# Patient Record
Sex: MALE | Race: WHITE | NOT HISPANIC OR LATINO | Employment: FULL TIME | URBAN - METROPOLITAN AREA
[De-identification: names, ages, dates, MRNs, and addresses within clinical notes are randomized per-mention and may not be internally consistent; named-entity substitution may affect disease eponyms.]

---

## 2021-01-12 ENCOUNTER — OFFICE VISIT (OUTPATIENT)
Dept: GASTROENTEROLOGY | Facility: CLINIC | Age: 66
End: 2021-01-12
Payer: COMMERCIAL

## 2021-01-12 VITALS
SYSTOLIC BLOOD PRESSURE: 178 MMHG | HEIGHT: 72 IN | BODY MASS INDEX: 26.82 KG/M2 | WEIGHT: 198 LBS | DIASTOLIC BLOOD PRESSURE: 102 MMHG

## 2021-01-12 DIAGNOSIS — Z12.11 SCREENING FOR COLON CANCER: ICD-10-CM

## 2021-01-12 DIAGNOSIS — K58.2 IRRITABLE BOWEL SYNDROME WITH BOTH CONSTIPATION AND DIARRHEA: ICD-10-CM

## 2021-01-12 DIAGNOSIS — R10.32 LEFT LOWER QUADRANT ABDOMINAL PAIN: ICD-10-CM

## 2021-01-12 DIAGNOSIS — R10.12 LEFT UPPER QUADRANT ABDOMINAL PAIN: Primary | ICD-10-CM

## 2021-01-12 PROCEDURE — 99244 OFF/OP CNSLTJ NEW/EST MOD 40: CPT | Performed by: INTERNAL MEDICINE

## 2021-01-12 RX ORDER — LOVASTATIN 40 MG/1
40 TABLET ORAL DAILY
COMMUNITY
Start: 2021-01-08

## 2021-01-12 NOTE — PROGRESS NOTES
[facility] - Outpatient Consultation  Magdalena Verdugo 48 y o  male MRN: [id number]  Encounter: [id number]    ASSESSMENT AND PLAN:      1  Left upper quadrant abdominal pain  Postprandial left upper quadrant abdominal pain sounds more dyspeptic and upper GI in etiology then the left lower quadrant pain that sounds more spastic and colonic  Differential diagnosis includes peptic ulcer disease, gastritis, less likely GERD in the absence of significant heartburn, unlikely to be celiac although he does have irregular bowel habits thought to be irritable bowel  Symptoms certainly could be all functional as he does have a history of irritable bowel syndrome and recent stressors likely exacerbating  · Diet as tolerated  · Observe for any trigger foods  · Schedule EGD    2  Left lower quadrant abdominal pain  Left lower quadrant abdominal pain sounds more functional   He has had irregularities in his bowels over recent months  This could have been triggered by an infectious etiology  Increased stress with possible move and marital issues are likely compounding factors  Doubt colonic neoplasia in the absence of family history, however his last colonoscopy was about 8 years ago so it is reasonable to reassess given the prolonged symptoms he had this fall  In addition to diet we discussed the benefits of fiber therapy  · As with upper GI symptoms observe for any trigger foods  · Encourage plenty of dietary fiber and fluids  · Would add fiber supplement such as Metamucil/psyllium 2 spoons daily  · Plan colonoscopy    3  Irritable bowel syndrome with both constipation and diarrhea  As discussed above his crampy abdominal pain postprandial dyspepsia and altered bowel habits may all be functional and related to his irritable bowel syndrome  Will plan EGD and colonoscopy to exclude other pathology as outlined  In addition to diet suggest fiber supplementation      4  Screening for colon cancer  Average risk with previous negative colonoscopy  Await results of colonoscopy for recommendations of subsequent screening  Followup Appointment:  About 6 weeks pending result of EGD and colonoscopy  ______________________________________________________________________    Chief Complaint   Patient presents with    Change in Bowel Habits     cramping and discomfort       HPI:   Vinita Silva is a 48y o  year old male who presents left-sided abdominal pain  Gets abdominal discomfort and cramping with eating  Crampy abdominal pain starts in epigastrium and LUQ shortly after eating  Lots of belching and flatus  Has episodes like this intermittently  Assumes much is IBS, often has constipation  Symptoms generally resolve over time, but this time symptoms return  During the st ten days feeling well  Small amount of gas and sharp LUQ pain has resolved  PCP suggested follow up colonoscopy  Bowel habits have changed  Some days no stool, some days would have normal stool, followed by a subsequent runny stool  Theses too have gotten better over the past two weeks  No FHX of CRC, No IBD, no celiac  Cut back on large meals  No fever, nausea or vomiting  Didn't feel well, had trouble waling  No melena or heme  No heartburn or reflux except with some dietary indiscretions  No dysphagia  No new medications or antibiotics  Following blood pressure, due to recent increases  [place]emplating [place] out of [place]   from [place] and planing to relocate  No ASA/NSAID's/supplements  Historical Information   Past Medical History:   Diagnosis Date    Hyperlipidemia     Hypertension      History reviewed  No pertinent surgical history    Social History     Substance and Sexual Activity   Alcohol Use Not Currently     Social History     Substance and Sexual Activity   Drug Use Never     Social History     Tobacco Use   Smoking Status Never Smoker   Smokeless Tobacco Never Used     Family History   Problem Relation Age of Onset    Colon cancer Neg Hx     Colon polyps Neg Hx        Meds/Allergies     Current Outpatient Medications:     lovastatin (MEVACOR) 40 MG tablet    No Known Allergies    PHYSICAL EXAM:    Blood pressure (!) 178/102, height 6' (1 829 m), weight 89 8 kg (198 lb)  Body mass index is 26 85 kg/m²  General Appearance: NAD, cooperative, alert  Eyes: Anicteric, PERRLA, EOMI  ENT:  Normocephalic, atraumatic, normal mucosa  Neck:  Supple, symmetrical, trachea midline,   Resp:  Clear to auscultation bilaterally; no rales, rhonchi or wheezing; respirations unlabored   CV:  S1 S2, Regular rate and rhythm; no murmur, rub, or gallop  GI:  Soft, non-tender, non-distended; normal bowel sounds; no masses, no organomegaly   Rectal: Deferred  Musculoskeletal: No cyanosis, clubbing or edema  Normal ROM  Skin:  No jaundice, rashes, or lesions   Heme/Lymph: No palpable cervical lymphadenopathy  Psych: Normal affect, good eye contact  Neuro: No gross deficits, AAOx3    Lab Results:   No results found for: WBC, HGB, HCT, MCV, PLT  No results found for: NA, K, CL, CO2, ANIONGAP, BUN, CREATININE, GLUCOSE, GLUF, CALCIUM, CORRECTEDCA, AST, ALT, ALKPHOS, PROT, BILITOT, EGFR  No results found for: IRON, TIBC, FERRITIN  No results found for: LIPASE    Radiology Results:   No results found  REVIEW OF SYSTEMS:    CONSTITUTIONAL: Denies any fever, chills, rigors, and weight loss  HEENT: No earache or tinnitus  Denies hearing loss or visual disturbances  CARDIOVASCULAR: No chest pain or palpitations  RESPIRATORY: Denies any cough, hemoptysis, shortness of breath or dyspnea on exertion  GASTROINTESTINAL: As noted in the History of Present Illness  GENITOURINARY: No problems with urination  Denies any hematuria or dysuria  NEUROLOGIC: No dizziness or vertigo, denies headaches  MUSCULOSKELETAL: Denies any muscle or joint pain  SKIN: Denies skin rashes or itching     ENDOCRINE: Denies excessive thirst  Denies intolerance to heat or cold  PSYCHOSOCIAL: Denies depression or anxiety  Denies any recent memory loss

## 2021-01-12 NOTE — PATIENT INSTRUCTIONS
Diet as tolerated, observe for any trigger foods causing symptoms  Encourage plenty of dietary fiber and fluids  Would add fiber supplement such as Metamucil/ generic psyllium husk 2 spoons daily  Schedule EGD and colonoscopy at [facility] [place] ([facility])

## 2021-01-12 NOTE — LETTER
January 12, 2021     Pema Celestin DO  [address] [facility]    [place] [phone]    Patient: Concepcion Ojeda   YOB: 1965   Date of Visit: 1/12/2021       Dear Dr Kellogg Ny: Thank you for referring Concepcion Ojeda to me for evaluation  Below are my notes for this consultation  If you have questions, please do not hesitate to call me  I look forward to following your patient along with you  Sincerely,        Viviane Leach MD        CC: No Recipients  Viviane Leach MD  1/12/2021  5:30 PM  Sign when Signing Visit    [facility] - Outpatient Consultation  Concepcion Ojeda 48 y o  male MRN: [id number]  Encounter: [id number]    ASSESSMENT AND PLAN:      1  Left upper quadrant abdominal pain  Postprandial left upper quadrant abdominal pain sounds more dyspeptic and upper GI in etiology then the left lower quadrant pain that sounds more spastic and colonic  Differential diagnosis includes peptic ulcer disease, gastritis, less likely GERD in the absence of significant heartburn, unlikely to be celiac although he does have irregular bowel habits thought to be irritable bowel  Symptoms certainly could be all functional as he does have a history of irritable bowel syndrome and recent stressors likely exacerbating  · Diet as tolerated  · Observe for any trigger foods  · Schedule EGD    2  Left lower quadrant abdominal pain  Left lower quadrant abdominal pain sounds more functional   He has had irregularities in his bowels over recent months  This could have been triggered by an infectious etiology  Increased stress with possible move and marital issues are likely compounding factors  Doubt colonic neoplasia in the absence of family history, however his last colonoscopy was about 8 years ago so it is reasonable to reassess given the prolonged symptoms he had this fall  In addition to diet we discussed the benefits of fiber therapy    · As with upper GI symptoms observe for any trigger foods  · Encourage plenty of dietary fiber and fluids  · Would add fiber supplement such as Metamucil/psyllium 2 spoons daily  · Plan colonoscopy    3  Irritable bowel syndrome with both constipation and diarrhea  As discussed above his crampy abdominal pain postprandial dyspepsia and altered bowel habits may all be functional and related to his irritable bowel syndrome  Will plan EGD and colonoscopy to exclude other pathology as outlined  In addition to diet suggest fiber supplementation  4  Screening for colon cancer  Average risk with previous negative colonoscopy  Await results of colonoscopy for recommendations of subsequent screening  Followup Appointment:  About 6 weeks pending result of EGD and colonoscopy  ______________________________________________________________________    Chief Complaint   Patient presents with    Change in Bowel Habits     cramping and discomfort       HPI:   Mlaena Fortune is a 48y o  year old male who presents left-sided abdominal pain  Gets abdominal discomfort and cramping with eating  Crampy abdominal pain starts in epigastrium and LUQ shortly after eating  Lots of belching and flatus  Has episodes like this intermittently  Assumes much is IBS, often has constipation  Symptoms generally resolve over time, but this time symptoms return  During the st ten days feeling well  Small amount of gas and sharp LUQ pain has resolved  PCP suggested follow up colonoscopy  Bowel habits have changed  Some days no stool, some days would have normal stool, followed by a subsequent runny stool  Theses too have gotten better over the past two weeks  No FHX of CRC, No IBD, no celiac  Cut back on large meals  No fever, nausea or vomiting  Didn't feel well, had trouble waling  No melena or heme  No heartburn or reflux except with some dietary indiscretions  No dysphagia  No new medications or antibiotics    Following blood pressure, due to recent increases  Contemplating moving out of [place]   from wife and planing to relocate  No ASA/NSAID's/supplements  Historical Information   Past Medical History:   Diagnosis Date    Hyperlipidemia     Hypertension      History reviewed  No pertinent surgical history  Social History     Substance and Sexual Activity   Alcohol Use Not Currently     Social History     Substance and Sexual Activity   Drug Use Never     Social History     Tobacco Use   Smoking Status Never Smoker   Smokeless Tobacco Never Used     Family History   Problem Relation Age of Onset    Colon cancer Neg Hx     Colon polyps Neg Hx        Meds/Allergies     Current Outpatient Medications:     lovastatin (MEVACOR) 40 MG tablet    No Known Allergies    PHYSICAL EXAM:    Blood pressure (!) 178/102, height 6' (1 829 m), weight 89 8 kg (198 lb)  Body mass index is 26 85 kg/m²  General Appearance: NAD, cooperative, alert  Eyes: Anicteric, PERRLA, EOMI  ENT:  Normocephalic, atraumatic, normal mucosa  Neck:  Supple, symmetrical, trachea midline,   Resp:  Clear to auscultation bilaterally; no rales, rhonchi or wheezing; respirations unlabored   CV:  S1 S2, Regular rate and rhythm; no murmur, rub, or gallop  GI:  Soft, non-tender, non-distended; normal bowel sounds; no masses, no organomegaly   Rectal: Deferred  Musculoskeletal: No cyanosis, clubbing or edema  Normal ROM  Skin:  No jaundice, rashes, or lesions   Heme/Lymph: No palpable cervical lymphadenopathy  Psych: Normal affect, good eye contact  Neuro: No gross deficits, AAOx3    Lab Results:   No results found for: WBC, HGB, HCT, MCV, PLT  No results found for: NA, K, CL, CO2, ANIONGAP, BUN, CREATININE, GLUCOSE, GLUF, CALCIUM, CORRECTEDCA, AST, ALT, ALKPHOS, PROT, BILITOT, EGFR  No results found for: IRON, TIBC, FERRITIN  No results found for: LIPASE    Radiology Results:   No results found        REVIEW OF SYSTEMS:    CONSTITUTIONAL: Denies any fever, chills, rigors, and weight loss  HEENT: No earache or tinnitus  Denies hearing loss or visual disturbances  CARDIOVASCULAR: No chest pain or palpitations  RESPIRATORY: Denies any cough, hemoptysis, shortness of breath or dyspnea on exertion  GASTROINTESTINAL: As noted in the History of Present Illness  GENITOURINARY: No problems with urination  Denies any hematuria or dysuria  NEUROLOGIC: No dizziness or vertigo, denies headaches  MUSCULOSKELETAL: Denies any muscle or joint pain  SKIN: Denies skin rashes or itching  ENDOCRINE: Denies excessive thirst  Denies intolerance to heat or cold  PSYCHOSOCIAL: Denies depression or anxiety  Denies any recent memory loss

## 2021-02-26 VITALS — HEIGHT: 72 IN | WEIGHT: 198 LBS | BODY MASS INDEX: 26.82 KG/M2

## 2021-02-26 DIAGNOSIS — Z12.11 SCREENING FOR COLON CANCER: Primary | ICD-10-CM

## 2021-02-26 RX ORDER — SODIUM PICOSULFATE, MAGNESIUM OXIDE, AND ANHYDROUS CITRIC ACID 10; 3.5; 12 MG/160ML; G/160ML; G/160ML
LIQUID ORAL
Qty: 2 BOTTLE | Refills: 0 | Status: SHIPPED | OUTPATIENT
Start: 2021-02-26 | End: 2021-03-05 | Stop reason: HOSPADM

## 2021-02-26 RX ORDER — LISINOPRIL 10 MG/1
10 TABLET ORAL DAILY
COMMUNITY

## 2021-02-26 NOTE — TELEPHONE ENCOUNTER
Why does your doctor want you to have this procedure? Do you have kidney disease?  no  If yes, are you on dialysis :     Have you had diverticulitis within the past 2 months? no    Are you diabetic?  no  If yes, insulin dependent: If yes, provide diabetic instructions sheet     Do take iron supplements?  no  If yes, instruct patient to hold iron supplement for 7 days prior    Are you on a blood thinner? no   Was the blood thinner sheet complete and faxed to cardiologist no  Plavix (clopidogrel), Coumadin (warfarin), Lovenox (enoxaparin), Xarelto (rivaroxaban), Pradaxa(dabigatran), Eliquis(apixaban) Savaysa/Lixiana (edoxapan)    Do you have an automatic implantable cardiac defibrillator (AICD)/pacemaker ([facility])? no  Was AICD/pacemaker sheet completed and faxed to cardiologist? no    Are you on home oxygen? no  If yes, continuous or nocturnal:     Have you been treated for MRSA, VRE or any communicable diseases? no    Heart attack, stroke, or stent within 3 months? no  Schedule at Hospital if within 3-6 months   Use nitroglycerin for chest pain in the last 6 months? no    History of organ  transplant?  no   If yes, notify Endo      History of neck/throat/tongue surgery or cancer? no  IF yes, notify Endo      Any problems with anesthesia in the past? no     Was stool C diff ordered?  no Stool specimen needs to be completed prior to procedure    Do have any facial or body piercings?no     Do you have a latex allergy? no     Do have an allergy to metals? (Bravo study only) no     If pediatric patient, was consent faxed to pediatrician no     Patient rights reviewed yes     Combo phone prep completed; clenpiq instructions reviewed and emailed to pt; rx forwarded to provider

## 2021-03-01 NOTE — TELEPHONE ENCOUNTER
Pt left  mssg that prep is $60 for proc Fri; req [phone]  Called Pt back; advised we can provide a sample  Pt to  Clenpiq sample this afternoon

## 2021-03-05 ENCOUNTER — ANESTHESIA (OUTPATIENT)
Dept: GASTROENTEROLOGY | Facility: AMBULATORY SURGERY CENTER | Age: 66
End: 2021-03-05

## 2021-03-05 ENCOUNTER — ANESTHESIA EVENT (OUTPATIENT)
Dept: GASTROENTEROLOGY | Facility: AMBULATORY SURGERY CENTER | Age: 66
End: 2021-03-05

## 2021-03-05 ENCOUNTER — HOSPITAL ENCOUNTER (OUTPATIENT)
Dept: GASTROENTEROLOGY | Facility: AMBULATORY SURGERY CENTER | Age: 66
Discharge: HOME | End: 2021-03-05
Payer: COMMERCIAL

## 2021-03-05 VITALS
SYSTOLIC BLOOD PRESSURE: 141 MMHG | DIASTOLIC BLOOD PRESSURE: 89 MMHG | OXYGEN SATURATION: 99 % | RESPIRATION RATE: 16 BRPM | TEMPERATURE: 98.2 F | HEART RATE: 71 BPM

## 2021-03-05 DIAGNOSIS — K58.2 IRRITABLE BOWEL SYNDROME WITH BOTH CONSTIPATION AND DIARRHEA: ICD-10-CM

## 2021-03-05 DIAGNOSIS — R10.32 LEFT LOWER QUADRANT ABDOMINAL PAIN: ICD-10-CM

## 2021-03-05 DIAGNOSIS — R10.12 LEFT UPPER QUADRANT ABDOMINAL PAIN: ICD-10-CM

## 2021-03-05 PROCEDURE — 43239 EGD BIOPSY SINGLE/MULTIPLE: CPT | Performed by: INTERNAL MEDICINE

## 2021-03-05 PROCEDURE — 45380 COLONOSCOPY AND BIOPSY: CPT | Performed by: INTERNAL MEDICINE

## 2021-03-05 RX ORDER — SODIUM CHLORIDE 9 MG/ML
50 INJECTION, SOLUTION INTRAVENOUS CONTINUOUS
Status: DISCONTINUED | OUTPATIENT
Start: 2021-03-05 | End: 2021-03-05

## 2021-03-05 RX ORDER — PROPOFOL 10 MG/ML
INJECTION, EMULSION INTRAVENOUS AS NEEDED
Status: DISCONTINUED | OUTPATIENT
Start: 2021-03-05 | End: 2021-03-05

## 2021-03-05 RX ADMIN — PROPOFOL 150 MG: 10 INJECTION, EMULSION INTRAVENOUS at 08:18

## 2021-03-05 RX ADMIN — PROPOFOL 100 MG: 10 INJECTION, EMULSION INTRAVENOUS at 08:34

## 2021-03-05 RX ADMIN — PROPOFOL 50 MG: 10 INJECTION, EMULSION INTRAVENOUS at 08:40

## 2021-03-05 RX ADMIN — PROPOFOL 50 MG: 10 INJECTION, EMULSION INTRAVENOUS at 08:21

## 2021-03-05 RX ADMIN — SODIUM CHLORIDE 50 ML/HR: 9 INJECTION, SOLUTION INTRAVENOUS at 07:46

## 2021-03-05 RX ADMIN — PROPOFOL 50 MG: 10 INJECTION, EMULSION INTRAVENOUS at 08:25

## 2021-03-05 NOTE — ANESTHESIA PREPROCEDURE EVALUATION
Procedure:  COLONOSCOPY  EGD    Relevant Problems   CARDIO   (+) Hyperlipidemia   (+) Hypertension        Physical Exam    Airway    Mallampati score: II  TM Distance: >3 FB  Neck ROM: full     Dental   Comment: caps,     Cardiovascular  Rhythm: regular, Rate: normal, Cardiovascular exam normal    Pulmonary  Pulmonary exam normal Breath sounds clear to auscultation,     Other Findings        Anesthesia Plan  ASA Score- 2     Anesthesia Type- IV sedation with anesthesia with ASA Monitors  Additional Monitors:   Airway Plan:           Plan Factors-        Patient is not a current smoker  Induction- intravenous  Postoperative Plan-     Informed Consent- Anesthetic plan and risks discussed with patient

## 2021-03-05 NOTE — DISCHARGE INSTRUCTIONS
Celiac Disease   WHAT YOU NEED TO KNOW:   What is celiac disease? Celiac disease is a long-term condition that affects your small intestine  Your immune system reacts to the protein gluten in food and damages your small intestine  You may not be able to absorb vitamins, minerals, and other nutrients from the foods you eat  What increases my risk of celiac disease? The cause of celiac disease is not known  You are at higher risk if you have another autoimmune disease, such as rheumatoid arthritis, or a family member with celiac disease  What are the signs and symptoms of celiac disease? The most common symptom of celiac disease is diarrhea that may smell bad or look oily  You may also have the following:  · Stomach pain, bloating, gas, and weight loss    · Weakness, low energy, and loss of appetite    · Bone pain or osteoporosis (bone loss)    · Missed monthly periods or difficulty getting pregnant    · Numbness or tingling in your legs and muscle cramps    · Mouth sores or a skin rash that itches    How is celiac disease diagnosed? · Blood tests  are used to check for antibodies to gluten  They may also be used to check for anemia and other deficiencies caused by celiac disease  · A sample of your bowel movement  is tested to see if you are absorbing nutrients from your diet  For this test, you will need to eat a high-fat diet for 1 day  Then you will need to collect your bowel movements for 2 days  The samples will be sent to a lab  · Small bowel barium x-rays  are pictures of your abdomen  You will need to swallow a thick liquid called barium  Barium helps the intestines show up better on the x-ray  · An endoscopic tissue biopsy  is a procedure to look at the inside of your small intestine  An endoscope is a bendable tube with a light and camera on the end  Healthcare providers may remove a small amount of tissue for a biopsy  How is celiac disease treated?   Since there is no cure for celiac disease, the goal of treatment is to reduce the symptoms  It may take up to 6 months or longer for your intestines to function better  You may need medicine such as steroids to suppress your immune system and decrease inflammation  How do I manage celiac disease? · Do not eat food that contains gluten  This is the most important way to manage your symptoms  Do not eat anything made with wheat, rye, barley, or oats  Gluten is found in additives in many packaged and restaurant foods  Read food labels or ask before you order food  A dietitian can help you plan meals that do not contain gluten  · Ask about supplements  You may need to take supplements that contain iron, folic acid, vitamin N78, calcium, or vitamin D  These may be given as a pill or through an IV  When should I contact my healthcare provider? · You have new symptoms or your symptoms get worse  · You have questions or concerns about your condition or care  When should I seek immediate care or call 911? · You have a fever  · You have severe abdominal pain  · You have blood in your bowel movement  CARE AGREEMENT:   You have the right to help plan your care  Learn about your health condition and how it may be treated  Discuss treatment options with your healthcare providers to decide what care you want to receive  You always have the right to refuse treatment  The above information is an  only  It is not intended as medical advice for individual conditions or treatments  Talk to your doctor, nurse or pharmacist before following any medical regimen to see if it is safe and effective for you  © Copyright [address] Information is for End User's use only and may not be sold, redistributed or otherwise used for commercial purposes   All illustrations and images included in CareNotes® are the copyrighted property of [employer]  or [facility]  Helicobacter Pylori   WHAT YOU NEED TO KNOW:   What is a Helicobacter pylori (H  pylori) infection? H  pylori bacteria infect the lining of the stomach and upper intestine  People are usually infected with the bacteria as children but do not have symptoms until they are adults  What are the signs and symptoms of an H  pylori infection? Most people who are infected with H  pylori never have symptoms  If you do, your symptoms may come and go and last for minutes or hours  You may feel better for a short time after you eat food or take medicine  You may have any of the following:  · Dull or burning pain in your stomach or throat    · Nausea, vomiting, bloating, or burping    · Loss of appetite or weight loss    · Pain at night or with an empty stomach    How is an H  pylori infection diagnosed? · A urea breath test  means you will swallow pudding, liquid, or a capsule that contains a chemical  Then you will breathe into a container  Your breath sample will be tested for a reaction to the chemical that confirms H  pylori infection  · Bowel movement or blood samples  may be tested for infection  · Endoscopy  is a procedure that may be done if your healthcare provider thinks you have an ulcer  He or she will use a scope to see the inside of your stomach  A scope is a soft, flexible tube with a light and tiny camera on the end  It is passed down your throat and into your stomach  Samples of your stomach tissue may be removed and tested for H  pylori infection  How is an H  pylori infection treated? It is important to treat the infection  H  pylori may lead to changes in the cells of your esophagus or stomach  The cells are changed into intestine cells  This is a condition called intestinal metaplasia that increases your risk for cancer of the esophagus or stomach  The following may be used to treat an infection:  · Antibiotics  help kill the bacteria  You may need to take this medicine for 10 to 14 days   Your healthcare provider will prescribe at least 2 antibiotics at the same time  · Antiulcer medicines  help decrease the amount of acid that is normally made by the stomach  These help relieve pain and heal or prevent ulcers  · Bismuth  is a liquid or tablet that may be used to decrease heartburn, upset stomach, or diarrhea  It may also decrease swelling in your stomach and help kill the infection if other medicines do not work  It also protects ulcers from stomach acid so they can heal     How can I prevent or manage an H  pylori infection? · Wash your hands often  Infection can happen through contact with infected bowel movement, vomit, or saliva  Use soap and warm water  Use an alcohol-based gel if soap and water are not available  Clean your hands before you eat and after you use the bathroom  Clean your hands after you change a baby's diaper  · Handle food properly  Infection can happen if you drink water that is not clean or eat food that is not washed or cooked properly  Rinse food well before you cook or eat it  [place] food all the way through  Proper handling will help kill any bacteria that may be on your food  · Drink clean water from a safe source  Only drink water that has been filtered or purified  · Ask about NSAIDs  NSAIDs can cause stomach bleeding and kidney problems if not taken correctly  Your healthcare provider may tell you to avoid these medicines because they can make your symptoms worse  · Do not smoke  Nicotine and other chemicals in cigarettes and cigars can worsen your symptoms  Ask your provider for information if you currently smoke and need help to quit  E-cigarettes or smokeless tobacco still contain nicotine  Talk to your provider before you use these products  · Do not drink alcohol  Alcohol may worsen your symptoms of heartburn  Alcohol also increases the risk for cancer of the esophagus or stomach  Ask your provider for information if you currently drink alcohol and need help to quit      When should I seek immediate care? · You have bloody bowel movements, bloody vomit, or vomit that looks like coffee grounds  · You have sudden, sharp stomach pain that does not go away or spreads to your back  When should I call my doctor? · Your symptoms do not improve with treatment  · You feel full after eating only a small amount of food  · You lose weight without trying  · You have questions or concerns about your condition or care  CARE AGREEMENT:   You have the right to help plan your care  Learn about your health condition and how it may be treated  Discuss treatment options with your healthcare providers to decide what care you want to receive  You always have the right to refuse treatment  The above information is an  only  It is not intended as medical advice for individual conditions or treatments  Talk to your doctor, nurse or pharmacist before following any medical regimen to see if it is safe and effective for you  © Copyright [employer] Information is for End User's use only and may not be sold, redistributed or otherwise used for commercial purposes  All illustrations and images included in CareNotes® are the copyrighted property of [employer]  or [address] [place] [address]   WHAT YOU NEED TO KNOW:   What is Garcia esophagus? Garcia esophagus is a condition that is also called intestinal metaplasia  Cells that line your esophagus change into cells that are like intestine cells  The change increases your risk for esophageal cancer  What increases my risk for Garcia esophagus? The exact cause of Garcia esophagus is not known  The following may increase your risk:  · Long-term gastroesophageal reflux disease (GERD)    · Age older than 48    · A family history of GERD, Garcia esophagus, or esophageal cancer    · Obesity    · Smoking cigarettes    What are the signs and symptoms of Garcia esophagus?   Signs and symptoms are usually related to the signs and symptoms of GERD  You may have any of the following:  · Heartburn (burning pain in your chest)    · Pain after meals that spreads to your neck, jaw, or shoulder    · Pain that gets better when you change positions    · Bitter or acid taste in your mouth    · A dry cough    · Trouble swallowing or pain with swallowing    · Hoarseness or a sore throat    · Burping or hiccups    · Feeling full soon after you start eating    How is Garcia esophagus diagnosed? An endoscopy is a procedure used to see the inside of your esophagus and stomach  A scope is a long, bendable tube with a light on the end of it  A camera may be hooked to the scope  Your healthcare provider may also take tissue samples to be tested for dysplasia (abnormal changes in your cells and tissues)  If dysplasia is found, it will be given a grade to describe the amount of change  The grade can range from negative (no dysplasia) to high-grade (a large amount)  You have a higher risk for cancer with high-grade dysplasia  How is Garcia esophagus treated? Treatment depends on the grade of dysplasia  The goal of treatment is to control your symptoms and prevent esophageal cancer  Your healthcare provider may also suggest that you make changes in the foods you eat and in your lifestyle  You may need any of the following:  · Anti-reflux medicines  help decrease the stomach acid that can irritate your esophagus and stomach  These medicines may include proton pump inhibitors (PPI) and histamine type-2 receptor (H2) blockers  You may also be given medicines to stop vomiting  · Surgery or other procedures  may be done if you have high-grade dysplasia  Abnormal cells may be killed with heat or by freezing them  Light may be used to kill abnormal cells  It is used in combination with medicines that make the abnormal cells sensitive to light   Surgery may be used to wrap the upper part of your stomach around the esophageal sphincter to strengthen it  Surgery may be needed to remove part or all of your esophagus  What can I do to manage Garcia esophagus? · Do not eat foods that make your symptoms worse  Examples are chocolate, garlic, onions, spicy or fatty foods, citrus fruits (oranges), and tomato-based foods (spaghetti sauce)  Do not drink alcohol, drinks that contain caffeine, or carbonated drinks, such as soda  Ask your healthcare provider if there are other foods and drinks you should not have  · Maintain a healthy weight  If you are overweight, weight loss may help relieve symptoms  Ask your healthcare provider about safe ways to lose weight  · Do not smoke  If you smoke, it is never too late to quit  Smoking may worsen acid reflux  Ask your healthcare provider for information if you need help quitting  Where can I get support and more information? · [address]  [facility]  Phone: [phone]  Web Address: http://[email or web address]  org    Call your local emergency number (911 in the [address] [place] [address],[address]) if:   · You have severe chest pain and shortness of breath  When should I seek immediate care? · Your bowel movements are black, bloody, or tarry  · Your vomit looks like coffee grounds or has blood in it  When should I call my doctor? · Your symptoms do not improve with treatment  · You have questions or concerns about your condition or care  CARE AGREEMENT:   You have the right to help plan your care  Learn about your health condition and how it may be treated  Discuss treatment options with your healthcare providers to decide what care you want to receive  You always have the right to refuse treatment  The above information is an  only  It is not intended as medical advice for individual conditions or treatments  Talk to your doctor, nurse or pharmacist before following any medical regimen to see if it is safe and effective for you    © Copyright [employer] 2020 Information is for End User's use only and may not be sold, redistributed or otherwise used for commercial purposes  All illustrations and images included in CareNotes® are the copyrighted property of [employer] M [employer] Inc  or Charisse Angelo  Diverticulosis   WHAT YOU NEED TO KNOW:   What is diverticulosis? Diverticulosis is a condition that causes small pockets called diverticula to form in your intestine  These pockets make it difficult for bowel movements to pass through your digestive system  What causes diverticulosis? Diverticula form when muscles have to work hard to move bowel movements through the intestine  The force causes bulges to form at weak areas in the intestine  This may happen if you eat foods that are low in fiber  Fiber helps give your bowel movements more bulk so they are larger and easier to move through your colon  The following may increase your risk of diverticulosis:  · A history of constipation    · Age 36 or older    · Obesity    · Lack of exercise    What are the signs and symptoms of diverticulosis? Diverticulosis usually does not cause any signs or symptoms  It may cause any of the following in some people:  · Pain or discomfort in your lower abdomen    · Abdominal bloating    · Constipation or diarrhea    How is diverticulosis diagnosed? Your healthcare provider will examine you and ask about your bowel movements, diet, and symptoms  He or she will also ask about any medical conditions you have or medicines you take  You may need any of the following:  · Blood tests  may be done to check for signs of inflammation  · A barium enema  is an x-ray of your colon that may show diverticula  A tube is put into your anus, and a liquid called barium is put through the tube  Barium is used so that healthcare providers can see your colon more clearly  · Flexible sigmoidoscopy  is a test to look for any changes in your lower intestines and rectum   It may also show the cause of any bleeding or pain  A soft, bendable tube with a light on the end will be put into your anus  It will then be moved forward into your intestine  · A colonoscopy  is used to look at your whole colon  A scope (long bendable tube with a light on the end) is used to take pictures  This test may show diverticula  · A CT scan , or CAT scan, may show diverticula  You may be given contrast liquid before the scan  Tell the healthcare provider if you have ever had an allergic reaction to contrast liquid  How is diverticulosis managed? The goal of treatment is to manage any symptoms you have and prevent other problems such as diverticulitis  Diverticulitis is swelling or infection of the diverticula  Your healthcare provider may recommend any of the following:  · Eat a variety of high-fiber foods  High-fiber foods help you have regular bowel movements  High-fiber foods include cooked beans, fruits, vegetables, and some cereals  Most adults need 25 to 35 grams of fiber each day  Your healthcare provider may recommend that you have more  Ask your healthcare provider how much fiber you need  Increase fiber slowly  You may have abdominal discomfort, bloating, and gas if you add fiber to your diet too quickly  You may need to take a fiber supplement if you are not getting enough fiber from food  · Medicines  to soften your bowel movements may be given  You may also need medicines to treat symptoms such as bloating and pain  · Drink liquids as directed  You may need to drink 2 to 3 liters (8 to 12 cups) of liquids every day  Ask your healthcare provider how much liquid to drink each day and which liquids are best for you  · Apply heat  on your abdomen for 20 to 30 minutes every 2 hours for as many days as directed  Heat helps decrease pain and muscle spasms  How can I help prevent diverticulitis or other symptoms?   The following may help decrease your risk for diverticulitis or symptoms, such as bleeding  Talk to your provider about these or other things you can do to prevent problems that may occur with diverticulosis  · Exercise regularly  Ask your healthcare provider about the best exercise plan for you  Exercise can help you have regular bowel movements  Get 30 minutes of exercise on most days of the week  · Maintain a healthy weight  Ask your healthcare provider how much you should weigh  Ask him or her to help you create a weight loss plan if you are overweight  · Do not smoke  Nicotine and other chemicals in cigarettes increase your risk for diverticulitis  Ask your healthcare provider for information if you currently smoke and need help to quit  E-cigarettes or smokeless tobacco still contain nicotine  Talk to your healthcare provider before you use these products  · Ask your healthcare provider if it is safe to take NSAIDs  NSAIDs may increase your risk of diverticulitis  When should I seek immediate care? · You have severe pain on the left side of your lower abdomen  · Your bowel movements are bright or dark red  When should I contact my healthcare provider? · You have a fever and chills  · You feel dizzy or lightheaded  · You have nausea, or you are vomiting  · You have a change in your bowel movements  · You have questions or concerns about your condition or care  CARE AGREEMENT:   You have the right to help plan your care  Learn about your health condition and how it may be treated  Discuss treatment options with your healthcare providers to decide what care you want to receive  You always have the right to refuse treatment  The above information is an  only  It is not intended as medical advice for individual conditions or treatments  Talk to your doctor, nurse or pharmacist before following any medical regimen to see if it is safe and effective for you    © Copyright [employer] 2020 Information is for End User's use only and may not be sold, redistributed or otherwise used for commercial purposes  All illustrations and images included in CareNotes® are the copyrighted property of Uri LEDESMA  or [facility] Fiber Diet   WHAT YOU NEED TO KNOW:   What is a high-fiber diet? A high-fiber diet includes foods that have a high amount of fiber  Fiber is the part of fruits, vegetables, and grains that is not broken down by your body  Fiber keeps your bowel movements regular  Fiber can also help lower your cholesterol level, control blood sugar in people with diabetes, and relieve constipation  Fiber can also help you control your weight because it helps you feel full faster  Most adults should eat 25 to 35 grams of fiber each day  Talk to your dietitian or healthcare provider about the amount of fiber you need  What foods are good sources of fiber? · Foods with at least 4 grams of fiber per serving:      ? ? to ½ cup of high-fiber cereal (check the nutrition label on the box)    ? ½ cup of blackberries or raspberries    ? 4 dried prunes    ? 1 cooked artichoke    ? ½ cup of cooked legumes, such as lentils, or red, kidney, and barker beans    · Foods with 1 to 3 grams of fiber per serving:      ? 1 slice of whole-wheat, pumpernickel, or rye bread    ? ½ cup of cooked brown rice    ? 4 whole-wheat crackers    ? 1 cup of oatmeal    ? ½ cup of cereal with 1 to 3 grams of fiber per serving (check the nutrition label on the box)    ? 1 small piece of fruit, such as an apple, banana, pear, kiwi, or orange    ? 3 dates    ? ½ cup of canned apricots, fruit cocktail, peaches, or pears    ? ½ cup of raw or cooked vegetables, such as carrots, cauliflower, cabbage, spinach, squash, or corn  What are some ways that I can increase fiber in my diet? · Choose brown or wild rice instead of white rice  · Use whole wheat flour in recipes instead of white or all-purpose flour  · Add beans and peas to casseroles or soups       · Choose fresh fruit and vegetables with peels or skins on instead of juices  What other guidelines should I follow? · Add fiber to your diet slowly  You may have abdominal discomfort, bloating, and gas if you add fiber to your diet too quickly  · Drink plenty of liquids as you add fiber to your diet  You may have nausea or develop constipation if you do not drink enough water  Ask how much liquid to drink each day and which liquids are best for you  CARE AGREEMENT:   You have the right to help plan your care  Discuss treatment options with your healthcare provider to decide what care you want to receive  You always have the right to refuse treatment  The above information is an  only  It is not intended as medical advice for individual conditions or treatments  Talk to your doctor, nurse or pharmacist before following any medical regimen to see if it is safe and effective for you  © Copyright [address] Information is for End User's use only and may not be sold, redistributed or otherwise used for commercial purposes  All illustrations and images included in CareNotes® are the copyrighted property of [employer] M , Inc  or [facility]  Microscopic Colitis   WHAT YOU NEED TO KNOW:   What is microscopic colitis? Microscopic colitis is long-term inflammation of your colon (large intestine)  Inflammation can damage the lining of your colon and cause long-term diarrhea  Microscopic colitis may be caused by an infection, higher levels of acid in your colon, or the cause may not be known  What increases my risk for microscopic colitis? · Older age    · Being female    · Increased bile acids    · Family history of microscopic colitis or another colon disease    · Bacterial infection    · Medicines for pain, depression, ulcers, or seizures    · Weakened immune system    What are the signs and symptoms of microscopic colitis?    · Diarrhea    · Abdominal pain    · Fatigue     · Nausea or vomiting    · Weight loss    · Loss of bowel movement control    · Dehydration (thirst, dry mouth, or decreased urine)    How is microscopic colitis diagnosed? · Blood tests  may show if you have an infection  · A bowel movement sample  may show what germ is causing your illness  · A colonoscopy  may show what is causing your colitis  A tube with a light and camera on the end will be put into your anus, and moved forward into your intestine  How is microscopic colitis treated? Medicines may be given to treat a bacterial infection, decrease inflammation in your colon, or treat diarrhea  You may also need medicine to decrease acid levels in your colon that could cause irritation  How can I manage my symptoms? · Eat a variety of healthy foods  Healthy foods include fruits, vegetables, whole-grain breads, low-fat dairy products, beans, lean meats, and fish  You may need to eat several small meals throughout the day  Avoid spicy foods, caffeine, chocolate, and foods high in fat  · Drink liquids as directed  to help prevent dehydration  Good liquids to drink include water, juice, and broth  Ask how much liquid to drink each day  You may need to drink an oral rehydration solution (ORS)  An ORS contains a balance of water, salt, and sugar to replace body fluids lost during diarrhea  · Start to exercise when you feel better  Regular exercise helps your bowels work normally  Ask about the best exercise plan for you  · Ask about probiotics  You may need supplements that help balance the bacteria in your colon  This may help decrease you symptoms  How can I help prevent microscopic colitis? · Wash your hands  Wash your hands in warm, soapy water for 20 seconds before and after you handle food  Wash your hands after you use the bathroom, change a diaper, or touch an animal      · Clean food and areas that come in contact with food  Rinse fruits and vegetables in running water   Clean cutting boards, knives, countertops, and other areas where you prepare food before and after you cook  Wash sponges and dishtowels weekly in hot water  · [employer] food all the way through  Cook eggs until the yolks are firm  Use a meat thermometer to make sure meat is heated to a temperature that will kill bacteria  Do not eat raw or undercooked chicken, turkey, seafood, or meat  · Store food properly  Refrigerate or freeze fruits and vegetables, cooked foods, and leftovers  · Drink safe water  Drink only treated water  Do not drink water from ponds or lakes, or from swimming pools that do not contain chlorine  Drink bottled water when traveling  Call 911 for any of the following:   · You have trouble breathing  When should I seek immediate care? · You have any of the following signs of severe dehydration:     ? Dizziness or weakness    ? Dry mouth, cracked lips, or severe thirst    ? Fast heartbeat or breathing    ? Passing little to no urine    · You have black or bright red stools  · You have blood in your vomit  · Your abdomen feels larger than normal, hard, and painful  When should I contact my healthcare provider? · You have a fever with abdominal pain that does not go away  · You have a fever, chills, cough, or feel weak and achy  · Your diarrhea gets worse  · Your symptoms do not improve or get worse  · You are losing weight without trying  · You have questions or concerns about your condition or care  CARE AGREEMENT:   You have the right to help plan your care  Learn about your health condition and how it may be treated  Discuss treatment options with your healthcare providers to decide what care you want to receive  You always have the right to refuse treatment  The above information is an  only  It is not intended as medical advice for individual conditions or treatments   Talk to your doctor, nurse or pharmacist before following any medical regimen to see if it is safe and effective for you  © Copyright [address] Information is for End User's use only and may not be sold, redistributed or otherwise used for commercial purposes  All illustrations and images included in CareNotes® are the copyrighted property of A D A M , Inc  or Charisse Petty   Hemorrhoids   WHAT YOU NEED TO KNOW:   What are hemorrhoids? Hemorrhoids are swollen blood vessels inside your rectum (internal hemorrhoids) or on your anus (external hemorrhoids)  Sometimes a hemorrhoid may prolapse  This means it extends out of your anus  What increases my risk for hemorrhoids? · Pregnancy or obesity    · Straining or sitting for a long time during bowel movements    · Liver disease    · Weak muscles around the anus caused by older age, rectal surgery, or anal intercourse    · A lack of physical activity    · Chronic diarrhea or constipation    · A low-fiber diet    What are the signs and symptoms of hemorrhoids? · Pain or itching around your anus or inside your rectum    · Swelling or bumps around your anus    · Bright red blood in your bowel movement, on the toilet paper, or in the toilet bowl    · Tissue bulging out of your anus (prolapsed hemorrhoids)    · Incontinence (poor control over urine or bowel movements)    How are hemorrhoids diagnosed? Your healthcare provider will ask about your symptoms, the foods you eat, and your bowel movements  He or she will examine your anus for external hemorrhoids  You may need the following:  · A digital rectal exam  is a test to check for hemorrhoids  Your healthcare provider will put a gloved finger inside your anus to feel for the hemorrhoids  · An anoscopy  is a test that uses a scope (small tube with a light and camera on the end) to look at your hemorrhoids  How are hemorrhoids treated? Treatment will depend on your symptoms   You may need any of the following:  · Medicines  can help decrease pain and swelling, and soften your bowel movement  The medicine may be a pill, pad, cream, or ointment  · Procedures  may be used to shrink or remove your hemorrhoid  Examples include rubber-band ligation, sclerotherapy, and photocoagulation  These procedures may be done in your healthcare provider's office  Ask your healthcare provider for more information about these procedures  · Surgery  may be needed to shrink or remove your hemorrhoids  How can I manage my symptoms? · Apply ice on your anus for 15 to 20 minutes every hour or as directed  Use an ice pack, or put crushed ice in a plastic bag  Cover it with a towel before you apply it to your anus  Ice helps prevent tissue damage and decreases swelling and pain  · Take a sitz bath  Fill a bathtub with 4 to 6 inches of warm water  You may also use a sitz bath pan that fits inside a toilet bowl  Sit in the sitz bath for 15 minutes  Do this 3 times a day, and after each bowel movement  The warm water can help decrease pain and swelling  · Keep your anal area clean  Gently wash the area with warm water daily  Soap may irritate the area  After a bowel movement, wipe with moist towelettes or wet toilet paper  Dry toilet paper can irritate the area  How can I help prevent hemorrhoids? · Do not strain to have a bowel movement  Do not sit on the toilet too long  These actions can increase pressure on the tissues in your rectum and anus  · Drink plenty of liquids  Liquids can help prevent constipation  Ask how much liquid to drink each day and which liquids are best for you  · Eat a variety of high-fiber foods  Examples include fruits, vegetables, and whole grains  Ask your healthcare provider how much fiber you need each day  You may need to take a fiber supplement  · Exercise as directed  Exercise, such as walking, may make it easier to have a bowel movement  Ask your healthcare provider to help you create an exercise plan       · Do not have anal sex   Anal sex can weaken the skin around your rectum and anus  · Avoid heavy lifting  This can cause straining and increase your risk for another hemorrhoid  When should I seek immediate care? · You have severe pain in your rectum or around your anus  · You have severe pain in your abdomen and you are vomiting  · You have bleeding from your anus that soaks through your underwear  When should I contact my healthcare provider? · You have frequent and painful bowel movements  · Your hemorrhoid looks or feels more swollen than usual      · You do not have a bowel movement for 2 days or more  · You see or feel tissue coming through your anus  · You have questions or concerns about your condition or care  CARE AGREEMENT:   You have the right to help plan your care  Learn about your health condition and how it may be treated  Discuss treatment options with your healthcare providers to decide what care you want to receive  You always have the right to refuse treatment  The above information is an  only  It is not intended as medical advice for individual conditions or treatments  Talk to your doctor, nurse or pharmacist before following any medical regimen to see if it is safe and effective for you  © Copyright [facility] [address] Information is for End User's use only and may not be sold, redistributed or otherwise used for commercial purposes   All illustrations and images included in CareNotes® are the copyrighted property of [employer]  or [address] [place] [address] [facility]

## 2021-03-05 NOTE — H&P
History and Physical - [facility]  Justin Erazo 60 y o  male MRN: [id number]    HPI: Justin Erazo is a 60y o  year old male who presents for EGD and colonoscopy for evaluation of upper and lower left-sided abdominal pain and altered bowel habits    REVIEW OF SYSTEMS: Per the HPI, and otherwise unremarkable  Historical Information   Past Medical History:   Diagnosis Date    Hyperlipidemia     Hypertension      Past Surgical History:   Procedure Laterality Date    APPENDECTOMY      COLONOSCOPY      April 2013 normal/negative colonoscopy except for internal hemorrhoids    HERNIA REPAIR       Social History   Social History     Substance and Sexual Activity   Alcohol Use Not Currently     Social History     Substance and Sexual Activity   Drug Use Never     Social History     Tobacco Use   Smoking Status Never Smoker   Smokeless Tobacco Never Used     Family History   Problem Relation Age of Onset    Colon cancer Neg Hx     Colon polyps Neg Hx     Celiac disease Neg Hx     Inflammatory bowel disease Neg Hx        Meds/Allergies       Current Outpatient Medications:     lisinopril (ZESTRIL) 10 mg tablet    lovastatin (MEVACOR) 40 MG tablet    Sod Picosulfate-Mag Ox-Cit Acd (Clenpiq) 10-3 5-12 MG-GM -GM/160ML SOLN    Current Facility-Administered Medications:     sodium chloride 0 9 % infusion, 50 mL/hr, Intravenous, Continuous, 50 mL/hr at 03/05/21 0746    No Known Allergies    Objective     /87   Pulse 83   Temp 98 2 °F (36 8 °C) (Temporal)   Resp 15   SpO2 99%     PHYSICAL EXAM    Gen: NAD AAOx3  CV: S1S2 RRR no m/r/g  CHEST: Clear b/l no c/r/w  ABD: soft, +BS NT/ND  EXT: no edema    ASSESSMENT/PLAN:  This is a 60y o  year old male here for EGD and colonoscopy, and he is stable and optimized for his procedure

## 2021-03-05 NOTE — ANESTHESIA POSTPROCEDURE EVALUATION
Post-Op Assessment Note    CV Status:  Stable  Pain Score: 0    Pain management: adequate     Mental Status:  Alert and awake   Hydration Status:  Euvolemic   PONV Controlled:  Controlled   Airway Patency:  Patent      Post Op Vitals Reviewed: Yes      Staff: Anesthesiologist         No complications documented      /81 (03/05/21 0851)    Temp      Pulse 74 (03/05/21 0851)   Resp 14 (03/05/21 0851)    SpO2 98 % (03/05/21 0851)

## 2021-03-09 ENCOUNTER — TELEPHONE (OUTPATIENT)
Dept: GASTROENTEROLOGY | Facility: CLINIC | Age: 66
End: 2021-03-09

## 2021-03-09 NOTE — TELEPHONE ENCOUNTER
Pt needs 6-8 week follow up with Dr Garfield Thomas   Spoke to pt he will cb to schedule he is currently at work jg

## 2021-04-20 ENCOUNTER — OFFICE VISIT (OUTPATIENT)
Dept: GASTROENTEROLOGY | Facility: CLINIC | Age: 66
End: 2021-04-20
Payer: COMMERCIAL

## 2021-04-20 VITALS
SYSTOLIC BLOOD PRESSURE: 138 MMHG | DIASTOLIC BLOOD PRESSURE: 80 MMHG | BODY MASS INDEX: 25.47 KG/M2 | HEIGHT: 72 IN | WEIGHT: 188 LBS

## 2021-04-20 DIAGNOSIS — Z12.11 SCREENING FOR COLON CANCER: ICD-10-CM

## 2021-04-20 DIAGNOSIS — R10.12 LEFT UPPER QUADRANT ABDOMINAL PAIN: ICD-10-CM

## 2021-04-20 DIAGNOSIS — R10.32 LEFT LOWER QUADRANT ABDOMINAL PAIN: ICD-10-CM

## 2021-04-20 DIAGNOSIS — K58.9 IRRITABLE BOWEL SYNDROME, UNSPECIFIED TYPE: Primary | ICD-10-CM

## 2021-04-20 PROCEDURE — 99213 OFFICE O/P EST LOW 20 MIN: CPT | Performed by: INTERNAL MEDICINE

## 2021-04-20 NOTE — PROGRESS NOTES
[facility] - Outpatient Follow-up Note  Maximo Phoenix 60 y o  male MRN: [id number]  Encounter: [id number]    ASSESSMENT AND PLAN:      1  Irritable bowel syndrome, unspecified type   IBS symptoms markedly improved  Watching diet and taking for regular fiber supplement  No further loose stools and bowels generally more regular  Pains have essentially resolved  Occasional symptoms when trigger foods are ingested  ·   Continue with healthy diet, avoid fatty foods, caffeine and any trigger foods  ·   Continue with daily fiber supplement  ·    Probiotic if needed    2  Left upper quadrant abdominal pain   resolved  3  Left lower quadrant abdominal pain   resolved    4  Screening for colon cancer   Colonoscopy last month negative for any polyp/neoplasm  Repeat screening in 10 years recommended  ·   Next colonoscopy due March 2031  Followup Appointment:  1 year sooner if needed  ______________________________________________________________________    Chief Complaint   Patient presents with    Follow-up     scope     HPI:  Feels better  Taking metamucil daily  No more runny stools  Now more regular  Rarely having left sided abdominal pain  Discussed fiber and probiotics  Lost some weight  Eating less processed foods  Just cut back on some foods  Historical Information   Past Medical History:   Diagnosis Date    Hyperlipidemia     Hypertension      Past Surgical History:   Procedure Laterality Date    APPENDECTOMY      COLONOSCOPY      April 2013 normal/negative colonoscopy except for internal hemorrhoids    COLONOSCOPY      March 2021:  Normal colonoscopy  Biopsies negative for microscopic colitis  Small hemorrhoids   HERNIA REPAIR      UPPER GASTROINTESTINAL ENDOSCOPY      March 2021:  Normal EGD    Biopsies negative for celiac, H pylori, EOE or Garcia's     Social History     Substance and Sexual Activity   Alcohol Use Not Currently     Social History     Substance and Sexual Activity   Drug Use Never     Social History     Tobacco Use   Smoking Status Never Smoker   Smokeless Tobacco Never Used     Family History   Problem Relation Age of Onset    Colon cancer Neg Hx     Colon polyps Neg Hx     Celiac disease Neg Hx     Inflammatory bowel disease Neg Hx          Current Outpatient Medications:     lisinopril (ZESTRIL) 10 mg tablet    lovastatin (MEVACOR) 40 MG tablet  No Known Allergies  Reviewed medications and allergies and updated as indicated    PHYSICAL EXAM:    Blood pressure 138/80, height 6' (1 829 m), weight 85 3 kg (188 lb)  Body mass index is 25 5 kg/m²  General Appearance: NAD, cooperative, alert  Eyes: Anicteric, PERRLA, EOMI  ENT:  Normocephalic, atraumatic, normal mucosa  Neck:  Supple, symmetrical, trachea midline  Resp:  Clear to auscultation bilaterally; no rales, rhonchi or wheezing; respirations unlabored   CV:  S1 S2, Regular rate and rhythm; no murmur, rub, or gallop  GI:  Soft, non-tender, non-distended; normal bowel sounds; no masses, no organomegaly   Rectal: Deferred  Musculoskeletal: No cyanosis, clubbing or edema  Normal ROM  Skin:  No jaundice, rashes, or lesions   Heme/Lymph: No palpable cervical lymphadenopathy  Psych: Normal affect, good eye contact  Neuro: No gross deficits, AAOx3    Lab Results:   No results found for: WBC, HGB, HCT, MCV, PLT  No results found for: NA, K, CL, CO2, ANIONGAP, BUN, CREATININE, GLUCOSE, GLUF, CALCIUM, CORRECTEDCA, AST, ALT, ALKPHOS, PROT, BILITOT, EGFR  No results found for: IRON, TIBC, FERRITIN  No results found for: LIPASE    Radiology Results:   No results found

## 2021-04-20 NOTE — PATIENT INSTRUCTIONS
Continue with healthy diet  Avoid any trigger foods  Continue with fiber supplement daily  Try to continue with light exercise daily

## 2021-04-20 NOTE — LETTER
April 20, 2021     Adelbert Ormond, DO  [address] [facility] [address]    Patient: Natalie Garibay   YOB: 1955   Date of Visit: 4/20/2021       Dear Dr Silas Frankel: Thank you for referring Natalie Garibay to me for evaluation  Below are my notes for this consultation  If you have questions, please do not hesitate to call me  I look forward to following your patient along with you  Sincerely,        Yu Alaniz MD        CC: No Recipients  Yu Alaniz MD  4/20/2021  4:41 PM  Sign when Signing Visit  [facility] - Outpatient Follow-up Note  Natalie Garibay 60 y o  male MRN: [id number]  Encounter: [id number]    ASSESSMENT AND PLAN:      1  Irritable bowel syndrome, unspecified type   IBS symptoms markedly improved  Watching diet and taking for regular fiber supplement  No further loose stools and bowels generally more regular  Pains have essentially resolved  Occasional symptoms when trigger foods are ingested  ·   Continue with healthy diet, avoid fatty foods, caffeine and any trigger foods  ·   Continue with daily fiber supplement  ·    Probiotic if needed    2  Left upper quadrant abdominal pain   resolved  3  Left lower quadrant abdominal pain   resolved    4  Screening for colon cancer   Colonoscopy last month negative for any polyp/neoplasm  Repeat screening in 10 years recommended  ·   Next colonoscopy due March 2031  Followup Appointment:  1 year sooner if needed  ______________________________________________________________________    Chief Complaint   Patient presents with    Follow-up     scope     HPI:  Feels better  Taking metamucil daily  No more runny stools  Now more regular  Rarely having left sided abdominal pain  Discussed fiber and probiotics  Lost some weight  Eating less processed foods  Just cut back on some foods        Historical Information   Past Medical History:   Diagnosis Date    Hyperlipidemia  Hypertension      Past Surgical History:   Procedure Laterality Date    APPENDECTOMY      COLONOSCOPY      April 2013 normal/negative colonoscopy except for internal hemorrhoids    COLONOSCOPY      March 2021:  Normal colonoscopy  Biopsies negative for microscopic colitis  Small hemorrhoids   HERNIA REPAIR      UPPER GASTROINTESTINAL ENDOSCOPY      March 2021:  Normal EGD  Biopsies negative for celiac, H pylori, EOE or Garcia's     Social History     Substance and Sexual Activity   Alcohol Use Not Currently     Social History     Substance and Sexual Activity   Drug Use Never     Social History     Tobacco Use   Smoking Status Never Smoker   Smokeless Tobacco Never Used     Family History   Problem Relation Age of Onset    Colon cancer Neg Hx     Colon polyps Neg Hx     Celiac disease Neg Hx     Inflammatory bowel disease Neg Hx          Current Outpatient Medications:     lisinopril (ZESTRIL) 10 mg tablet    lovastatin (MEVACOR) 40 MG tablet  No Known Allergies  Reviewed medications and allergies and updated as indicated    PHYSICAL EXAM:    Blood pressure 138/80, height 6' (1 829 m), weight 85 3 kg (188 lb)  Body mass index is 25 5 kg/m²  General Appearance: NAD, cooperative, alert  Eyes: Anicteric, PERRLA, EOMI  ENT:  Normocephalic, atraumatic, normal mucosa  Neck:  Supple, symmetrical, trachea midline  Resp:  Clear to auscultation bilaterally; no rales, rhonchi or wheezing; respirations unlabored   CV:  S1 S2, Regular rate and rhythm; no murmur, rub, or gallop  GI:  Soft, non-tender, non-distended; normal bowel sounds; no masses, no organomegaly   Rectal: Deferred  Musculoskeletal: No cyanosis, clubbing or edema  Normal ROM    Skin:  No jaundice, rashes, or lesions   Heme/Lymph: No palpable cervical lymphadenopathy  Psych: Normal affect, good eye contact  Neuro: No gross deficits, AAOx3    Lab Results:   No results found for: WBC, HGB, HCT, MCV, PLT  No results found for: NA, K, CL, CO2, ANIONGAP, BUN, CREATININE, GLUCOSE, GLUF, CALCIUM, CORRECTEDCA, AST, ALT, ALKPHOS, PROT, BILITOT, EGFR  No results found for: IRON, TIBC, FERRITIN  No results found for: LIPASE    Radiology Results:   No results found

## 2022-04-20 ENCOUNTER — OFFICE VISIT (OUTPATIENT)
Dept: GASTROENTEROLOGY | Facility: CLINIC | Age: 67
End: 2022-04-20
Payer: COMMERCIAL

## 2022-04-20 VITALS
HEART RATE: 83 BPM | BODY MASS INDEX: 25.6 KG/M2 | SYSTOLIC BLOOD PRESSURE: 128 MMHG | WEIGHT: 189 LBS | HEIGHT: 72 IN | DIASTOLIC BLOOD PRESSURE: 90 MMHG

## 2022-04-20 DIAGNOSIS — Z12.11 SCREENING FOR COLON CANCER: ICD-10-CM

## 2022-04-20 DIAGNOSIS — K58.9 IRRITABLE BOWEL SYNDROME, UNSPECIFIED TYPE: Primary | ICD-10-CM

## 2022-04-20 PROCEDURE — 99213 OFFICE O/P EST LOW 20 MIN: CPT | Performed by: INTERNAL MEDICINE

## 2022-04-20 RX ORDER — LISINOPRIL AND HYDROCHLOROTHIAZIDE 25; 20 MG/1; MG/1
1 TABLET ORAL DAILY
COMMUNITY

## 2022-04-20 NOTE — PATIENT INSTRUCTIONS
Continue with low-fat high-fiber diet  Continue to avoid any trigger foods moderation with portion sizes  Continue with fiber supplement

## 2022-04-20 NOTE — PROGRESS NOTES
[id number] - Outpatient Follow-up Note  Jaskaran Leonard 73 y o  male MRN: [id number]  Encounter: [id number]    ASSESSMENT AND PLAN:      1  Irritable bowel syndrome, unspecified type  IBS symptoms stable with daily fiber supplement along with dietary management  Avoiding trigger foods and large meals  Trying to eat healthy high-fiber diet  Not needing any anti spasmodic or acid suppression therapy  · Continue with dietary management, high-fiber healthy diet with avoidance of fatty foods, caffeine, alcohol and large meals  · Continue daily fiber supplement    2  Screening for colon cancer  Average risk  Up-to-date with screening colonoscopy  Recommend repeat exam every 10 years  · Next colonoscopy due March 2031      Followup Appointment:   12-18 months or sooner if needed  ______________________________________________________________________    Chief Complaint   Patient presents with    Follow up to colonoscopy from last year     HPI:  Controlling GI symptoms by diet  Taking metamucil daily  Eating dietary fiber tool  Rarely constipated  Stools normal and regular  No loose stools  No bleeding  Appetite and weight stable  Had hernia surgery last year, doing well  Historical Information   Past Medical History:   Diagnosis Date    Hyperlipidemia     Hypertension      Past Surgical History:   Procedure Laterality Date    APPENDECTOMY      COLONOSCOPY      April 2013 normal/negative colonoscopy except for internal hemorrhoids    COLONOSCOPY      March 2021:  Normal colonoscopy  Biopsies negative for microscopic colitis  Small hemorrhoids   HERNIA REPAIR      UPPER GASTROINTESTINAL ENDOSCOPY      March 2021:  Normal EGD    Biopsies negative for celiac, H pylori, EOE or Garcia's     Social History     Substance and Sexual Activity   Alcohol Use Not Currently     Social History     Substance and Sexual Activity   Drug Use Never     Social History Tobacco Use   Smoking Status Never Smoker   Smokeless Tobacco Never Used     Family History   Problem Relation Age of Onset    Colon cancer Neg Hx     Colon polyps Neg Hx     Celiac disease Neg Hx     Inflammatory bowel disease Neg Hx          Current Outpatient Medications:     lisinopril-hydrochlorothiazide (PRINZIDE,ZESTORETIC) 20-25 MG per tablet    lisinopril (ZESTRIL) 10 mg tablet    lovastatin (MEVACOR) 40 MG tablet  No Known Allergies  Reviewed medications and allergies and updated as indicated    PHYSICAL EXAM:    Blood pressure 128/90, pulse 83, height 6' (1 829 m), weight 85 7 kg (189 lb)  Body mass index is 25 63 kg/m²  General Appearance: NAD, cooperative, alert  Eyes: Anicteric, PERRLA, EOMI  ENT:  Normocephalic, atraumatic, normal mucosa  Neck:  Supple, symmetrical, trachea midline  Resp:  Clear to auscultation bilaterally; no rales, rhonchi or wheezing; respirations unlabored   CV:  S1 S2, Regular rate and rhythm; no murmur, rub, or gallop  GI:  Soft, non-tender, non-distended; normal bowel sounds; no masses, no organomegaly   Rectal: Deferred  Musculoskeletal: No cyanosis, clubbing or edema  Normal ROM  Skin:  No jaundice, rashes, or lesions   Heme/Lymph: No palpable cervical lymphadenopathy  Psych: Normal affect, good eye contact  Neuro: No gross deficits, AAOx3    Lab Results:   CBC normal, CMP normal except for glucose of 100 and albumin of 4 9    Radiology Results:   No results found

## 2022-04-20 NOTE — LETTER
April 20, 2022     Xander DO Kelle  [address] [facility] [address]    Patient: Sergo Bull   YOB: 1955   Date of Visit: 4/20/2022       Dear Dr Lary Gallego: Thank you for referring Sergo Bull to me for evaluation  Below are my notes for this consultation  If you have questions, please do not hesitate to call me  I look forward to following your patient along with you  Sincerely,        Gifty Leiva MD        CC: No Recipients  Gifty Leiva MD  4/20/2022  5:04 PM  Sign when Signing Visit  [address] [facility] - Outpatient Follow-up Note  Sergo Bull 73 y o  male MRN: [id number]  Encounter: [id number]    ASSESSMENT AND PLAN:      1  Irritable bowel syndrome, unspecified type  IBS symptoms stable with daily fiber supplement along with dietary management  Avoiding trigger foods and large meals  Trying to eat healthy high-fiber diet  Not needing any anti spasmodic or acid suppression therapy  · Continue with dietary management, high-fiber healthy diet with avoidance of fatty foods, caffeine, alcohol and large meals  · Continue daily fiber supplement    2  Screening for colon cancer  Average risk  Up-to-date with screening colonoscopy  Recommend repeat exam every 10 years  · Next colonoscopy due March 2031      Followup Appointment:   12-18 months or sooner if needed  ______________________________________________________________________    Chief Complaint   Patient presents with    Follow up to colonoscopy from last year     HPI:  Controlling GI symptoms by diet  Taking metamucil daily  Eating dietary fiber tool  Rarely constipated  Stools normal and regular  No loose stools  No bleeding  Appetite and weight stable  Had hernia surgery last year, doing well        Historical Information   Past Medical History:   Diagnosis Date    Hyperlipidemia     Hypertension      Past Surgical History:   Procedure Laterality Date    APPENDECTOMY      COLONOSCOPY      April 2013 normal/negative colonoscopy except for internal hemorrhoids    COLONOSCOPY      March 2021:  Normal colonoscopy  Biopsies negative for microscopic colitis  Small hemorrhoids   HERNIA REPAIR      UPPER GASTROINTESTINAL ENDOSCOPY      March 2021:  Normal EGD  Biopsies negative for celiac, H pylori, EOE or Garcia's     Social History     Substance and Sexual Activity   Alcohol Use Not Currently     Social History     Substance and Sexual Activity   Drug Use Never     Social History     Tobacco Use   Smoking Status Never Smoker   Smokeless Tobacco Never Used     Family History   Problem Relation Age of Onset    Colon cancer Neg Hx     Colon polyps Neg Hx     Celiac disease Neg Hx     Inflammatory bowel disease Neg Hx          Current Outpatient Medications:     lisinopril-hydrochlorothiazide (PRINZIDE,ZESTORETIC) 20-25 MG per tablet    lisinopril (ZESTRIL) 10 mg tablet    lovastatin (MEVACOR) 40 MG tablet  No Known Allergies  Reviewed medications and allergies and updated as indicated    PHYSICAL EXAM:    Blood pressure 128/90, pulse 83, height 6' (1 829 m), weight 85 7 kg (189 lb)  Body mass index is 25 63 kg/m²  General Appearance: NAD, cooperative, alert  Eyes: Anicteric, PERRLA, EOMI  ENT:  Normocephalic, atraumatic, normal mucosa  Neck:  Supple, symmetrical, trachea midline  Resp:  Clear to auscultation bilaterally; no rales, rhonchi or wheezing; respirations unlabored   CV:  S1 S2, Regular rate and rhythm; no murmur, rub, or gallop  GI:  Soft, non-tender, non-distended; normal bowel sounds; no masses, no organomegaly   Rectal: Deferred  Musculoskeletal: No cyanosis, clubbing or edema  Normal ROM    Skin:  No jaundice, rashes, or lesions   Heme/Lymph: No palpable cervical lymphadenopathy  Psych: Normal affect, good eye contact  Neuro: No gross deficits, AAOx3    Lab Results:   CBC normal, CMP normal except for glucose of 100 and albumin of 4 9    Radiology Results:   No results found

## 2022-10-12 PROBLEM — Z12.11 SCREENING FOR COLON CANCER: Status: RESOLVED | Noted: 2021-04-20 | Resolved: 2022-10-12

## 2023-03-02 ENCOUNTER — TELEPHONE (OUTPATIENT)
Dept: OTHER | Facility: OTHER | Age: 68
End: 2023-03-02

## 2023-03-02 NOTE — TELEPHONE ENCOUNTER
Patient called in after receipt of letter from office for colonoscopy recall  Review of patient's AVS 3/5/21 and OV 4/20/21, both report recall in 10 years (3/2031)  Please follow up with patient if something has changed

## 2023-03-02 NOTE — TELEPHONE ENCOUNTER
Called pt and told pt the correct recall is for 2031-the letter pt received was from [facility] with the old recall   Pt will disregard the letter he received

## 2023-10-11 ENCOUNTER — TELEPHONE (OUTPATIENT)
Age: 68
End: 2023-10-11

## 2023-10-11 NOTE — TELEPHONE ENCOUNTER
Patients GI provider:  Dr. Kathleen Ellington    Number to return call: [phone]    Reason for call: Pt calling stating he received a letter stating it's time for ov. Per pt he stated last time he saw Dr. Kathleen Ellington they both agreed that there is no reason for pt to come see the doctor unless it was necessary. Per pt all is fine and will not need an ov.     Scheduled procedure/appointment date if applicable: N/A